# Patient Record
Sex: FEMALE | Race: WHITE | URBAN - METROPOLITAN AREA
[De-identification: names, ages, dates, MRNs, and addresses within clinical notes are randomized per-mention and may not be internally consistent; named-entity substitution may affect disease eponyms.]

---

## 2023-03-17 ENCOUNTER — TELEPHONE (OUTPATIENT)
Dept: FAMILY MEDICINE CLINIC | Facility: CLINIC | Age: 33
End: 2023-03-17

## 2023-03-17 ENCOUNTER — OFFICE VISIT (OUTPATIENT)
Dept: FAMILY MEDICINE CLINIC | Facility: CLINIC | Age: 33
End: 2023-03-17
Payer: COMMERCIAL

## 2023-03-17 VITALS
HEIGHT: 63.82 IN | HEART RATE: 85 BPM | BODY MASS INDEX: 32.35 KG/M2 | TEMPERATURE: 98 F | OXYGEN SATURATION: 97 % | DIASTOLIC BLOOD PRESSURE: 70 MMHG | SYSTOLIC BLOOD PRESSURE: 118 MMHG | RESPIRATION RATE: 18 BRPM | WEIGHT: 187.19 LBS

## 2023-03-17 DIAGNOSIS — F41.9 ANXIETY AND DEPRESSION: ICD-10-CM

## 2023-03-17 DIAGNOSIS — Z13.29 SCREENING FOR ENDOCRINE, NUTRITIONAL, METABOLIC AND IMMUNITY DISORDER: ICD-10-CM

## 2023-03-17 DIAGNOSIS — F32.A ANXIETY AND DEPRESSION: ICD-10-CM

## 2023-03-17 DIAGNOSIS — Z13.0 SCREENING FOR ENDOCRINE, NUTRITIONAL, METABOLIC AND IMMUNITY DISORDER: ICD-10-CM

## 2023-03-17 DIAGNOSIS — Z13.21 SCREENING FOR ENDOCRINE, NUTRITIONAL, METABOLIC AND IMMUNITY DISORDER: ICD-10-CM

## 2023-03-17 DIAGNOSIS — Z12.4 SCREENING FOR CERVICAL CANCER: ICD-10-CM

## 2023-03-17 DIAGNOSIS — E66.9 OBESITY (BMI 30-39.9): ICD-10-CM

## 2023-03-17 DIAGNOSIS — Z00.00 ANNUAL PHYSICAL EXAM: Primary | ICD-10-CM

## 2023-03-17 DIAGNOSIS — Z13.228 SCREENING FOR ENDOCRINE, NUTRITIONAL, METABOLIC AND IMMUNITY DISORDER: ICD-10-CM

## 2023-03-17 DIAGNOSIS — Z11.3 SCREEN FOR STD (SEXUALLY TRANSMITTED DISEASE): ICD-10-CM

## 2023-03-17 DIAGNOSIS — N72 CERVICITIS: ICD-10-CM

## 2023-03-17 PROCEDURE — 87624 HPV HI-RISK TYP POOLED RSLT: CPT | Performed by: FAMILY MEDICINE

## 2023-03-17 PROCEDURE — 99385 PREV VISIT NEW AGE 18-39: CPT | Performed by: FAMILY MEDICINE

## 2023-03-17 PROCEDURE — 87491 CHLMYD TRACH DNA AMP PROBE: CPT | Performed by: FAMILY MEDICINE

## 2023-03-17 PROCEDURE — 87591 N.GONORRHOEAE DNA AMP PROB: CPT | Performed by: FAMILY MEDICINE

## 2023-03-17 PROCEDURE — 3078F DIAST BP <80 MM HG: CPT | Performed by: FAMILY MEDICINE

## 2023-03-17 PROCEDURE — 3008F BODY MASS INDEX DOCD: CPT | Performed by: FAMILY MEDICINE

## 2023-03-17 PROCEDURE — 3074F SYST BP LT 130 MM HG: CPT | Performed by: FAMILY MEDICINE

## 2023-03-17 RX ORDER — ESCITALOPRAM OXALATE 10 MG/1
10 TABLET ORAL DAILY
Qty: 30 TABLET | Refills: 1 | Status: SHIPPED | OUTPATIENT
Start: 2023-03-17 | End: 2023-04-15

## 2023-03-17 NOTE — TELEPHONE ENCOUNTER
Pt called stating that [facility] is unable to see the lab orders from this morning, and pt wanted to add the rest of the STI testing. Provider has added the STI testing labs to the orders and sent to [facility].   Pt informed, and verified that [facility] can see the lab orders.

## 2023-03-20 ENCOUNTER — PATIENT MESSAGE (OUTPATIENT)
Dept: FAMILY MEDICINE CLINIC | Facility: CLINIC | Age: 33
End: 2023-03-20

## 2023-03-20 LAB
C TRACH DNA SPEC QL NAA+PROBE: NEGATIVE
HPV I/H RISK 1 DNA SPEC QL NAA+PROBE: NEGATIVE
N GONORRHOEA DNA SPEC QL NAA+PROBE: NEGATIVE

## 2023-03-21 ENCOUNTER — PATIENT MESSAGE (OUTPATIENT)
Dept: FAMILY MEDICINE CLINIC | Facility: CLINIC | Age: 33
End: 2023-03-21

## 2023-03-22 ENCOUNTER — PATIENT MESSAGE (OUTPATIENT)
Dept: FAMILY MEDICINE CLINIC | Facility: CLINIC | Age: 33
End: 2023-03-22

## 2023-03-22 LAB
HEPATITIS B SURFACE$ANTIBODY QL: REACTIVE
HSV 1 IGG TYPE SPECIFIC$AB: 23.3 INDEX
HSV 2 IGG TYPE SPECIFIC$AB: 1.39 INDEX
SIGNAL TO CUT-OFF: 0.1
TREPONEMA PALLIDUM AB, PARTICLE AGGLUTINATION: REACTIVE
TSH W/REFLEX TO FT4: 0.96 MIU/L

## 2023-03-22 NOTE — TELEPHONE ENCOUNTER
From: Zechariah Benitez  To: Luciano Arellano MD  Sent: 3/22/2023 1:37 PM CDT  Subject: Question regarding RPR W/CONF    Hi, what is this test and the result?

## 2023-04-10 ENCOUNTER — PATIENT MESSAGE (OUTPATIENT)
Dept: FAMILY MEDICINE CLINIC | Facility: CLINIC | Age: 33
End: 2023-04-10

## 2023-04-11 NOTE — TELEPHONE ENCOUNTER
From: Marcus Coulter  To: Senia Adan MD  Sent: 4/10/2023 7:10 PM CDT  Subject: Possible Referral     Hi, so I wanted ask for guidance in my next step. I attached a photo of my butt crack that has the same lesion cut type as the photo I had attached from my vagina. My question is, is this something you can look into?  Or would I need a referral to a gynecologist or dermatologist?

## 2023-04-15 DIAGNOSIS — F32.A ANXIETY AND DEPRESSION: ICD-10-CM

## 2023-04-15 DIAGNOSIS — F41.9 ANXIETY AND DEPRESSION: ICD-10-CM

## 2023-04-18 RX ORDER — ESCITALOPRAM OXALATE 10 MG/1
10 TABLET ORAL DAILY
Qty: 90 TABLET | Refills: 1 | Status: SHIPPED | OUTPATIENT
Start: 2023-04-18

## 2023-04-21 ENCOUNTER — OFFICE VISIT (OUTPATIENT)
Dept: FAMILY MEDICINE CLINIC | Facility: CLINIC | Age: 33
End: 2023-04-21
Payer: COMMERCIAL

## 2023-04-21 VITALS
HEART RATE: 77 BPM | RESPIRATION RATE: 18 BRPM | WEIGHT: 194.81 LBS | DIASTOLIC BLOOD PRESSURE: 87 MMHG | BODY MASS INDEX: 34 KG/M2 | SYSTOLIC BLOOD PRESSURE: 126 MMHG | OXYGEN SATURATION: 99 % | TEMPERATURE: 98 F

## 2023-04-21 DIAGNOSIS — B37.2 CANDIDAL INTERTRIGO: Primary | ICD-10-CM

## 2023-04-21 PROCEDURE — 99214 OFFICE O/P EST MOD 30 MIN: CPT | Performed by: FAMILY MEDICINE

## 2023-04-21 PROCEDURE — 3074F SYST BP LT 130 MM HG: CPT | Performed by: FAMILY MEDICINE

## 2023-04-21 PROCEDURE — 3079F DIAST BP 80-89 MM HG: CPT | Performed by: FAMILY MEDICINE

## 2023-10-31 DIAGNOSIS — F41.9 ANXIETY AND DEPRESSION: ICD-10-CM

## 2023-10-31 DIAGNOSIS — F32.A ANXIETY AND DEPRESSION: ICD-10-CM

## 2023-10-31 DIAGNOSIS — B37.2 CANDIDAL INTERTRIGO: ICD-10-CM

## 2023-10-31 RX ORDER — ESCITALOPRAM OXALATE 10 MG/1
15 TABLET ORAL DAILY
Qty: 135 TABLET | Refills: 0 | OUTPATIENT
Start: 2023-10-31

## 2023-10-31 RX ORDER — ESCITALOPRAM OXALATE 20 MG/1
20 TABLET ORAL DAILY
Qty: 90 TABLET | Refills: 0 | Status: SHIPPED | OUTPATIENT
Start: 2023-10-31

## 2023-10-31 NOTE — TELEPHONE ENCOUNTER
Patient comment: I started taking 20mg about a month ago   Called pt to inform that she is due for follow up visit with Dr. Rob Rocha and she needs to discuss medication dose changes with the doctor. Virtual visit scheduled for 11/3/2023. Refill no protocol available:     Pt requesting refill of   Requested Prescriptions     Pending Prescriptions Disp Refills    escitalopram (LEXAPRO) 10 MG Oral Tab 135 tablet 0     Sig: Take 1.5 tablets (15 mg total) by mouth daily.      Sent to Provider for review:    Last Time Medication was Filled: 8/16/2023    Last Office Visit with Provider: 8/16/2023 telemedicine    Appt scheduled on 11/3/2023

## 2023-10-31 NOTE — TELEPHONE ENCOUNTER
Refill no protocol available:     Pt requesting refill of   Requested Prescriptions     Pending Prescriptions Disp Refills    NYSTATIN-TRIAMCINOLONE 100,000-0.1 Units/g-% External Cream [Pharmacy Med Name: Nystatin-Triamcinolone External Cream 651985-5.1 UNIT/GM-%] 60 g 0     Sig: Apply 1 Application topically 2 (two) times daily. Sent to Provider for review:    Last Time Medication was Filled: 4/21/2023    Last Office Visit with Provider: 8/16/2023 televisit    Appt scheduled on 11/03/2023   No future appointments.

## 2023-11-01 NOTE — TELEPHONE ENCOUNTER
Please call or message Bahman Wu to let her know that this time I sent in the 20 mg tab of Escitalopram.  So take 1 tab now (she previously had the 10 mg tab). Thanks.

## 2023-11-06 ENCOUNTER — PATIENT MESSAGE (OUTPATIENT)
Dept: FAMILY MEDICINE CLINIC | Facility: CLINIC | Age: 33
End: 2023-11-06

## 2023-12-04 DIAGNOSIS — F41.9 ANXIETY AND DEPRESSION: ICD-10-CM

## 2023-12-04 DIAGNOSIS — F32.A ANXIETY AND DEPRESSION: ICD-10-CM

## 2023-12-05 RX ORDER — BUPROPION HYDROCHLORIDE 150 MG/1
150 TABLET ORAL DAILY
Qty: 30 TABLET | Refills: 1 | OUTPATIENT
Start: 2023-12-05

## 2023-12-05 NOTE — TELEPHONE ENCOUNTER
30 tablets with 1 refill sent to [facility] on 11/3/2023. Called pharmacy and asked to fill remaninig refill.

## 2024-01-07 DIAGNOSIS — F41.9 ANXIETY AND DEPRESSION: ICD-10-CM

## 2024-01-07 DIAGNOSIS — F32.A ANXIETY AND DEPRESSION: ICD-10-CM

## 2024-01-09 RX ORDER — ESCITALOPRAM OXALATE 20 MG/1
20 TABLET ORAL DAILY
Qty: 90 TABLET | Refills: 0 | Status: SHIPPED | OUTPATIENT
Start: 2024-01-09

## 2024-01-09 RX ORDER — BUPROPION HYDROCHLORIDE 150 MG/1
150 TABLET ORAL DAILY
Qty: 90 TABLET | Refills: 0 | Status: SHIPPED | OUTPATIENT
Start: 2024-01-09

## 2024-01-09 NOTE — TELEPHONE ENCOUNTER
Refill no protocol available:     Pt requesting refill of   Requested Prescriptions     Pending Prescriptions Disp Refills    escitalopram (LEXAPRO) 20 MG Oral Tab 90 tablet 0     Sig: Take 1 tablet (20 mg total) by mouth daily.    buPROPion  MG Oral Tablet 24 Hr 30 tablet 1     Sig: Take 1 tablet (150 mg total) by mouth daily.           Sent to Provider for review:    Last Time Medication was Filled:  11/03/2023 BuPROPion  10/31/2023 Lexapro    Last Office Visit with Provider: 11/03/2023 televisit      No future appointments.

## 2024-01-25 ENCOUNTER — TELEPHONE (OUTPATIENT)
Dept: FAMILY MEDICINE CLINIC | Facility: CLINIC | Age: 34
End: 2024-01-25

## 2024-01-25 ENCOUNTER — PATIENT MESSAGE (OUTPATIENT)
Dept: FAMILY MEDICINE CLINIC | Facility: CLINIC | Age: 34
End: 2024-01-25

## 2024-01-25 ENCOUNTER — MED REC SCAN ONLY (OUTPATIENT)
Dept: FAMILY MEDICINE CLINIC | Facility: CLINIC | Age: 34
End: 2024-01-25

## 2024-01-25 NOTE — TELEPHONE ENCOUNTER
LMOM to return call to the office. Provided pt office phone [phone] along with office hours.        Patient will need to scheduled Physical exam due 03/2024.

## 2024-01-25 NOTE — TELEPHONE ENCOUNTER
From: Nimisha Kinney  To: Sherry Bermudez  Sent: 1/25/2024 8:57 AM CST  Subject: Blood test 2024    Hi, I’ve attached this year blood results. Please let me know if there is anything I should be worried about

## 2024-03-15 ENCOUNTER — OFFICE VISIT (OUTPATIENT)
Dept: FAMILY MEDICINE CLINIC | Facility: CLINIC | Age: 34
End: 2024-03-15
Payer: COMMERCIAL

## 2024-03-15 VITALS
HEIGHT: 63.82 IN | BODY MASS INDEX: 32.84 KG/M2 | RESPIRATION RATE: 18 BRPM | WEIGHT: 190 LBS | TEMPERATURE: 98 F | SYSTOLIC BLOOD PRESSURE: 128 MMHG | DIASTOLIC BLOOD PRESSURE: 76 MMHG

## 2024-03-15 DIAGNOSIS — F32.A ANXIETY AND DEPRESSION: ICD-10-CM

## 2024-03-15 DIAGNOSIS — R39.15 URINARY URGENCY: ICD-10-CM

## 2024-03-15 DIAGNOSIS — Z13.0 SCREENING FOR ENDOCRINE, NUTRITIONAL, METABOLIC AND IMMUNITY DISORDER: ICD-10-CM

## 2024-03-15 DIAGNOSIS — F84.0 AUTISM SPECTRUM DISORDER (HCC): ICD-10-CM

## 2024-03-15 DIAGNOSIS — F41.9 ANXIETY AND DEPRESSION: ICD-10-CM

## 2024-03-15 DIAGNOSIS — Z23 NEED FOR VACCINATION: ICD-10-CM

## 2024-03-15 DIAGNOSIS — Z13.6 SCREENING FOR ISCHEMIC HEART DISEASE: ICD-10-CM

## 2024-03-15 DIAGNOSIS — Z13.21 SCREENING FOR ENDOCRINE, NUTRITIONAL, METABOLIC AND IMMUNITY DISORDER: ICD-10-CM

## 2024-03-15 DIAGNOSIS — Z13.29 SCREENING FOR ENDOCRINE, NUTRITIONAL, METABOLIC AND IMMUNITY DISORDER: ICD-10-CM

## 2024-03-15 DIAGNOSIS — Z00.00 ANNUAL PHYSICAL EXAM: Primary | ICD-10-CM

## 2024-03-15 DIAGNOSIS — Z13.228 SCREENING FOR ENDOCRINE, NUTRITIONAL, METABOLIC AND IMMUNITY DISORDER: ICD-10-CM

## 2024-03-15 PROCEDURE — 99395 PREV VISIT EST AGE 18-39: CPT | Performed by: FAMILY MEDICINE

## 2024-03-15 NOTE — PROGRESS NOTES
Chief Complaint   Patient presents with    Physical     Annual exam w/o pap        HPI:   Nimisha Kinney is a 33 year old female who presents for a complete physical without gyne exam.   Patient feels well, dental visit up to date, no hearing problem.  Vaccinations: declines Flu    LMP: 3/08/24  Sexual activity:h/o female partner  Contraception: not interested  Exercise: occasional.  Diet:  regular    Wt Readings from Last 3 Encounters:   03/15/24 190 lb (86.2 kg)   04/21/23 194 lb 12.8 oz (88.4 kg)   03/17/23 187 lb 3.2 oz (84.9 kg)      BP Readings from Last 3 Encounters:   03/15/24 128/76   04/21/23 126/87   03/17/23 118/70     Patient's last menstrual period was 03/08/2024 (exact date).     Annual physical:  Overall pt states she feels well.     LMP: 3/08/24  Sexually Active: h/o female partner   Last pap smear: 3/2023, normal pap and HPV neg (rpt in 5 yrs)  Last mammogram: n/a  Last colonoscopy: n/a  Last DEXA Scan: n/a    Exercise: occasional  Diet: regular     Anxiety/Depression f-u: Pt states she is doing well on her current medication regimen of Lexapro and Bupropion. \"I don't feel like a robot.\"  She states she is \"living my best self right now\" and not sure if \"is it me? Or is since the medication change that I'm feeling better.\"  Energy level is good. Appetite is good. She is not feeling down or depressed.     Previous HPI: Pt states she has felt down lately- called off from work last week and 2 days this week so far.  She has decreased motivation.  Appetite is decreased. She is oversleeping.  She has no SI.  She already self-increased Lexapro to 20 mg (from 15 mg), tolerating well.     Previous HPI: She has been taking Lexapro 10 mg.  Anxiety is decreased and well-controlled,but \"I feel like a ghost, like a background character.\"  She has been sleeping more and has a decreased appetite. She has no SI.  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 She  continues to meet with her therapist regularly, weekly.     Previous HPI:  She was dx'd 1.5 months ago, since she was feeling sad and had low energy.  She was having SI, but no plan.  She knews she felt a certain way since she was a kid, but didn't recognize what it was. \"I knew I was different all of my life.\"  She has been attending counseling at [facility] since 9/2022 ([facility]). She was in couples therapy before with her ex-wife.  She was prescribed Lexapro 4 yrs ago, but did not take it consistently over a course of 6 months, then she stopped since she had a lapse in insurance.  She was also taking Trazodone, but was having vivid dreams on this.  She drinks EtOH, she also smokes marijuana daily \"to unwind and decompress.\"  She states she never has shown up under the influence at work.   She self-dx'd herself as on the spectrum from her own research.  A book she is reading has made her aware of her behaviors.  She states she has a h/o trauma.  Today she has no SI and no plan.     Urinary problem: pt states that she has had this problem \"forever.\"  She will urinate, but then shortly afterwards, she has to urinate again.  She has no burning with urination, she has no blood in the urine, no pelvic pain, no CVAT.  She is wondering if this is a nerve issue with her bladder.          Current Outpatient Medications   Medication Sig Dispense Refill    escitalopram (LEXAPRO) 20 MG Oral Tab Take 1 tablet (20 mg total) by mouth daily. 90 tablet 0    buPROPion  MG Oral Tablet 24 Hr Take 1 tablet (150 mg total) by mouth daily. 90 tablet 0    nystatin-triamcinolone 100,000-0.1 Units/g-% External Cream Apply 1 Application topically 2 (two) times daily. 60 g 1      Past Medical History:   Diagnosis Date    Anxiety     Depression       History reviewed. No pertinent surgical history.   Family History   Problem Relation Age of Onset    Anxiety Mother     Depression Mother     Thyroid Disorder Mother      Seizure Disorder Sister       Social History:  Social History     Socioeconomic History    Marital status: OTHER   Tobacco Use    Smoking status: Never    Smokeless tobacco: Never   Substance and Sexual Activity    Alcohol use: Not Currently    Drug use: Yes     Types: Cannabis     Comment: Everyday   Other Topics Concern    Caffeine Concern No    Exercise Yes     Comment: 10,000 steps everyday    Seat Belt Yes    Special Diet No    Stress Concern Yes    Weight Concern No       Allergies:  No Known Allergies     REVIEW OF SYSTEMS:     Review of Systems   Constitutional:  Negative for appetite change.   Gastrointestinal:  Negative for abdominal pain, diarrhea, nausea and vomiting.   Genitourinary:  Positive for frequency. Negative for difficulty urinating, dysuria, flank pain and menstrual problem.   Psychiatric/Behavioral:  Negative for confusion, decreased concentration, dysphoric mood and sleep disturbance. The patient is not nervous/anxious and is not hyperactive.         EXAM:   /76 (BP Location: Left arm, Patient Position: Sitting, Cuff Size: adult)   Temp 97.8 °F (36.6 °C) (Temporal)   Resp 18   Ht 5' 3.82\" (1.621 m)   Wt 190 lb (86.2 kg)   LMP 03/08/2024 (Exact Date)   BMI 32.80 kg/m²    GENERAL: WD/WN in no acute distress.   HEENT: PERRLA and EOMI.  OP moist no lesions.TM WNL, toya.Normal ears canals bilaterally.  Neck is supple, with no cervical LAD or thyroid abnormalities.  LUNGS: are clear to auscultation bilaterally, with no wheeze, rhonchi, or rales.   HEART: is RRR.  S1, S2, with no murmurs,clicks, gallops  BREAST:deferred  ABDOMEN: is soft,NBS, NT/ND with no HSM.  No rebound or guarding. No CVA tenderness, no hernias.   EXAM: deferred  RECTAL EXAM: deferred  NEURO: Cranial nerves II-XII normal,no focal abnormalities, and reflexes coordination and gait normal and symmetric.Sensation intact.  EXTREMETIES: are symmetric with no cyanosis, clubbing, or edema.  MS: Normal muscles tones, no  joints abnormalities.  SKIN: Normal color, turgor, no lesions, rashes or wounds.  PSYCH: normal affect and mood.    ASSESSMENT AND PLAN:     33 year old female with     1. Annual physical exam  Routine labs ordered today, await results. Counseled pt on healthy lifestyle changes. Vaccines today: declines Flu . Contraception: not interested .     Last pap smear: 3/2023, normal pap and HPV neg (rpt in 5 yrs)    - CBC With Differential With Platelet  - Comp Metabolic Panel  - Lipid Panel  - TSH W Reflex To Free T4    2. Anxiety and depression  - officially dx'd almost 1 year ago  - regular counseling at [facility]  - cont Lexapro 20 mg daily and Bupropion  mg QAM  - medication f-u in 6 months    3. Autism spectrum disorder (HCC)  - pt self-dx;d with autism  - recommend she see Neuropsych for formal testing and Dx    4. Screening for ischemic heart disease    - Lipid Panel    5. Screening for endocrine, nutritional, metabolic and immunity disorder    - CBC With Differential With Platelet  - Comp Metabolic Panel  - Lipid Panel  - TSH W Reflex To Free T4    6. Need for vaccination    - INFLUENZA REFUSED [facility]    7. Urinary urgency  - referral to Urogynecology    - Urogynecology Referral - In Network        Pt's was recommended low fat diet and aerobic exercise 30 minutes three times weekly.   Health maintenance.   Osteoporosis prevention addressed  Recommended whole food type diet, eliminate processed food/low sugar and low sat fat diet      The patient indicates understanding of these issues and agrees to the plan.    Return in about 6 months (around 9/15/2024) for anxiety, depression, medication follow-up.

## 2024-03-19 PROBLEM — F84.0 AUTISM SPECTRUM DISORDER (HCC): Status: ACTIVE | Noted: 2024-03-19

## 2024-04-11 DIAGNOSIS — F41.9 ANXIETY AND DEPRESSION: ICD-10-CM

## 2024-04-11 DIAGNOSIS — F32.A ANXIETY AND DEPRESSION: ICD-10-CM

## 2024-04-12 RX ORDER — ESCITALOPRAM OXALATE 20 MG/1
20 TABLET ORAL DAILY
Qty: 90 TABLET | Refills: 0 | Status: SHIPPED | OUTPATIENT
Start: 2024-04-12

## 2024-04-12 RX ORDER — BUPROPION HYDROCHLORIDE 150 MG/1
150 TABLET ORAL DAILY
Qty: 90 TABLET | Refills: 0 | Status: SHIPPED | OUTPATIENT
Start: 2024-04-12

## 2024-04-12 RX ORDER — BUPROPION HYDROCHLORIDE 150 MG/1
150 TABLET ORAL DAILY
Qty: 90 TABLET | Refills: 0 | OUTPATIENT
Start: 2024-04-12

## 2024-04-12 RX ORDER — ESCITALOPRAM OXALATE 20 MG/1
20 TABLET ORAL DAILY
Qty: 90 TABLET | Refills: 0 | OUTPATIENT
Start: 2024-04-12

## 2024-04-12 NOTE — TELEPHONE ENCOUNTER
Refill Passed Protocol:     Pt requesting refill of   Requested Prescriptions     Pending Prescriptions Disp Refills    escitalopram (LEXAPRO) 20 MG Oral Tab 90 tablet 0     Sig: Take 1 tablet (20 mg total) by mouth daily.    buPROPion  MG Oral Tablet 24 Hr 90 tablet 0     Sig: Take 1 tablet (150 mg total) by mouth daily.      Refill was approved and sent to pharmacy:   Anxiety and depression  - officially dx'd almost 1 year ago  - regular counseling at [facility]  - cont Lexapro 20 mg daily and Bupropion  mg QAM  - medication f-u in 6 months    Last Time Medication was Filled:  1/9/2024    Last Office Visit with Provider: 3/15/2024    No future appointments.     Return in about 6 months (around 9/15/2024) for anxiety, depression, medication follow-up.

## 2024-07-12 ENCOUNTER — TELEMEDICINE (OUTPATIENT)
Dept: FAMILY MEDICINE CLINIC | Facility: CLINIC | Age: 34
End: 2024-07-12
Payer: MEDICAID

## 2024-07-12 DIAGNOSIS — F41.9 ANXIETY AND DEPRESSION: ICD-10-CM

## 2024-07-12 DIAGNOSIS — F32.A ANXIETY AND DEPRESSION: ICD-10-CM

## 2024-07-12 PROCEDURE — 99214 OFFICE O/P EST MOD 30 MIN: CPT | Performed by: FAMILY MEDICINE

## 2024-07-12 RX ORDER — BUPROPION HYDROCHLORIDE 300 MG/1
300 TABLET ORAL DAILY
Qty: 90 TABLET | Refills: 1 | Status: SHIPPED | OUTPATIENT
Start: 2024-07-12

## 2024-07-12 NOTE — PROGRESS NOTES
Video Visit    This visit is conducted using Telemedicine with live, interactive video and audio.    Nimisha Kinney  verbally consents to a Video visit.    Patient understands and accepts financial responsibility for any deductible, co-insurance and/or co-pays associated with this service.    Duration of the service: 7:02 minutes      Summary of topics discussed:     Anxiety/Depression f-u: She has had increased anxiety lately with personal issues. 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  She has been taking the Lexapro and Wellbutrin consistently, no missed doses.  Despite taking care of her anxiety the way she has and addressing issues, she feels like she is just showing up to work \"and going through the  motions.\"  She feels like she is now missing the \"boost\" from when she first started the medication.  She is not in therapy right now, is familiar with how to cope and is very self aware.  Appetite is ok, sleeping ok.    Previous HPI from 3/2024: Pt states she is doing well on her current medication regimen of Lexapro and Bupropion. \"I don't feel like a robot.\"  She states she is \"living my best self right now\" and not sure if \"is it me? Or is since the medication change that I'm feeling better.\"  Energy level is good. Appetite is good. She is not feeling down or depressed.      Previous HPI: Pt states she has felt down lately- called off from work last week and 2 days this week so far.  She has decreased motivation.  Appetite is decreased. She is oversleeping.  She has no SI.  She already self-increased Lexapro to 20 mg (from 15 mg), tolerating well.     Previous HPI: She has been taking Lexapro 10 mg.  Anxiety is decreased and well-controlled,but \"I feel like a ghost, like a background character.\"  She has been sleeping more and has a decreased appetite. She has no SI.  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 She  continues to meet with her therapist regularly, weekly.     Previous HPI:  She was dx'd 1.5 months ago, since she was feeling sad and had low energy.  She was having SI, but no plan.  She knews she felt a certain way since she was a kid, but didn't recognize what it was. \"I knew I was different all of my life.\"  She has been attending counseling at [facility] since 9/2022 ([facility]). She was in couples therapy before with her ex-wife.  She was prescribed Lexapro 4 yrs ago, but did not take it consistently over a course of 6 months, then she stopped since she had a lapse in insurance.  She was also taking Trazodone, but was having vivid dreams on this.  She drinks EtOH, she also smokes marijuana daily \"to unwind and decompress.\"  She states she never has shown up under the influence at work.   She self-dx'd herself as on the spectrum from her own research.  A book she is reading has made her aware of her behaviors.  She states she has a h/o trauma.  Today she has no SI and no plan.       ROS: as stated per HPI  Physical Exam: Exam is limited due to no face to face visit today. Pt is awake and alert, does not appear to be in acute distress. Has normal affect. Answering questions appropriately.      Assessment/Plan:  1) Anxiety and depression  - no longer doing regular counseling at [facility]  - cont Lexapro 20 mg daily  - INCREASE Bupropion ER to 300 mg QAM  - medication f-u in 4-6 wks or sooner prn    Orders Placed This Encounter    buPROPion  MG Oral Tablet 24 Hr     Sig: Take 1 tablet (300 mg total) by mouth daily.     Dispense:  90 tablet     Refill:  1      No orders of the defined types were placed in this encounter.         Return for anxiety, depression, medication follow-up in 4-6 wks.      Sherry Bermudez MD

## 2024-08-19 DIAGNOSIS — F32.A ANXIETY AND DEPRESSION: ICD-10-CM

## 2024-08-19 DIAGNOSIS — F41.9 ANXIETY AND DEPRESSION: ICD-10-CM

## 2024-08-20 NOTE — TELEPHONE ENCOUNTER
Refill no protocol available:     Pt requesting refill of   Requested Prescriptions     Pending Prescriptions Disp Refills    ESCITALOPRAM 20 MG Oral Tab [Pharmacy Med Name: Escitalopram Oxalate Oral Tablet 20 MG] 90 tablet 0     Sig: Take 1 tablet (20 mg total) by mouth daily.           Sent to Provider for review:    Last Time Medication was Filled:  04/12/2024 90 tabs     Last Office Visit with Provider: 07/12/2024   No future appointments. Patient due for 4-6 week follow up now.   Left voicemail to return office call to schedule appointment. Provided office contact number

## 2024-08-21 RX ORDER — ESCITALOPRAM OXALATE 20 MG/1
20 TABLET ORAL DAILY
Qty: 90 TABLET | Refills: 0 | OUTPATIENT
Start: 2024-08-21

## 2024-08-23 ENCOUNTER — PATIENT MESSAGE (OUTPATIENT)
Dept: FAMILY MEDICINE CLINIC | Facility: CLINIC | Age: 34
End: 2024-08-23

## 2024-08-23 DIAGNOSIS — F32.A ANXIETY AND DEPRESSION: ICD-10-CM

## 2024-08-23 DIAGNOSIS — F41.9 ANXIETY AND DEPRESSION: ICD-10-CM

## 2024-08-23 RX ORDER — ESCITALOPRAM OXALATE 20 MG/1
20 TABLET ORAL DAILY
Qty: 90 TABLET | Refills: 0 | Status: SHIPPED | OUTPATIENT
Start: 2024-08-23

## 2024-08-23 NOTE — TELEPHONE ENCOUNTER
Refill Passed Protocol:     Pt requesting refill of   Requested Prescriptions     Pending Prescriptions Disp Refills    escitalopram (LEXAPRO) 20 MG Oral Tab 90 tablet 0     Sig: Take 1 tablet (20 mg total) by mouth daily.     Refill was approved and sent to pharmacy:   Anxiety and depression  - no longer doing regular counseling at [facility]  - cont Lexapro 20 mg daily  - INCREASE Bupropion ER to 300 mg QAM  - medication f-u in 4-6 wks or sooner prn     Last Time Medication was Filled:  4/12/2024 90 days 0 refill    Last Office Visit with Provider: Telemedicine 7/12/2024    No future appointments.  Return for anxiety, depression, medication follow-up in 4-6 wks.

## 2024-08-26 NOTE — TELEPHONE ENCOUNTER
From: Nimisha Kinney  To: Sherry Bermudez  Sent: 8/23/2024 1:39 PM CDT  Subject: Lexapro refill    Hello    I wanted to know if there is an issue with my lexpro being filled. The lela said it was denied and it wasn’t h been filled at [facility] like I requested. Im currently out of that medication

## 2024-11-22 DIAGNOSIS — F41.9 ANXIETY AND DEPRESSION: ICD-10-CM

## 2024-11-22 DIAGNOSIS — F32.A ANXIETY AND DEPRESSION: ICD-10-CM

## 2024-11-22 NOTE — TELEPHONE ENCOUNTER
Pt requesting refill of   Requested Prescriptions     Pending Prescriptions Disp Refills    buPROPion  MG Oral Tablet 24 Hr 90 tablet 0     Sig: Take 1 tablet (300 mg total) by mouth daily.     Last Time Medication was Filled:  7/12/2024 90 days 1 refill    Last Office Visit with Provider: Telemedicine 7/12/2024  Anxiety and depression  - no longer doing regular counseling at [facility]  - cont Lexapro 20 mg daily  - INCREASE Bupropion ER to 300 mg QAM  - medication f-u in 4-6 wks or sooner prn    No future appointments.

## 2024-11-23 RX ORDER — BUPROPION HYDROCHLORIDE 300 MG/1
300 TABLET ORAL DAILY
Qty: 30 TABLET | Refills: 0 | Status: SHIPPED | OUTPATIENT
Start: 2024-11-23

## 2024-11-29 DIAGNOSIS — F41.9 ANXIETY AND DEPRESSION: ICD-10-CM

## 2024-11-29 DIAGNOSIS — F32.A ANXIETY AND DEPRESSION: ICD-10-CM

## 2024-12-02 ENCOUNTER — PATIENT MESSAGE (OUTPATIENT)
Dept: FAMILY MEDICINE CLINIC | Facility: CLINIC | Age: 34
End: 2024-12-02

## 2024-12-02 DIAGNOSIS — F32.A ANXIETY AND DEPRESSION: ICD-10-CM

## 2024-12-02 DIAGNOSIS — F41.9 ANXIETY AND DEPRESSION: ICD-10-CM

## 2024-12-02 RX ORDER — ESCITALOPRAM OXALATE 20 MG/1
20 TABLET ORAL DAILY
Qty: 90 TABLET | Refills: 0 | OUTPATIENT
Start: 2024-12-02

## 2024-12-02 NOTE — TELEPHONE ENCOUNTER
Pt requesting refill of   Requested Prescriptions     Pending Prescriptions Disp Refills    ESCITALOPRAM 20 MG Oral Tab [Pharmacy Med Name: Escitalopram Oxalate Oral Tablet 20 MG] 90 tablet 0     Sig: TAKE 1 TABLET BY MOUTH EVERY DAY     Last Time Medication was Filled:  8/23/2024 90 days 0 refills    Last Office Visit with Provider: Telemedicine 7/12/2024  Anxiety and depression  - no longer doing regular counseling at [facility]  - cont Lexapro 20 mg daily  - INCREASE Bupropion ER to 300 mg QAM  - medication f-u in 4-6 wks or sooner prn    Return for anxiety, depression, medication follow-up in 4-6 wks.   No future appointments.

## 2024-12-02 NOTE — TELEPHONE ENCOUNTER
Patient is overdue for follow up appointment    Pt requesting refill of   Requested Prescriptions     Pending Prescriptions Disp Refills    ESCITALOPRAM 20 MG Oral Tab [Pharmacy Med Name: Escitalopram Oxalate Oral Tablet 20 MG] 90 tablet 0     Sig: TAKE 1 TABLET BY MOUTH EVERY DAY     Last Time Medication was Filled:  8/23/2024 9 days 0 refills    Last Office Visit with Provider: Telemedicine 7/12/2024  Anxiety and depression  - no longer doing regular counseling at [facility]  - cont Lexapro 20 mg daily  - INCREASE Bupropion ER to 300 mg QAM  - medication f-u in 4-6 wks or sooner prn    Return for anxiety, depression, medication follow-up in 4-6 wks.   No future appointments.

## 2025-04-25 ENCOUNTER — OFFICE VISIT (OUTPATIENT)
Dept: FAMILY MEDICINE CLINIC | Facility: CLINIC | Age: 35
End: 2025-04-25
Payer: MEDICAID

## 2025-04-25 VITALS
BODY MASS INDEX: 27.83 KG/M2 | HEART RATE: 75 BPM | HEIGHT: 63.82 IN | SYSTOLIC BLOOD PRESSURE: 132 MMHG | WEIGHT: 161 LBS | TEMPERATURE: 98 F | DIASTOLIC BLOOD PRESSURE: 68 MMHG | RESPIRATION RATE: 18 BRPM | OXYGEN SATURATION: 99 %

## 2025-04-25 DIAGNOSIS — R25.1 TREMOR: ICD-10-CM

## 2025-04-25 DIAGNOSIS — F41.9 ANXIETY AND DEPRESSION: ICD-10-CM

## 2025-04-25 DIAGNOSIS — F32.A ANXIETY AND DEPRESSION: ICD-10-CM

## 2025-04-25 DIAGNOSIS — Z00.00 ANNUAL PHYSICAL EXAM: Primary | ICD-10-CM

## 2025-04-25 PROCEDURE — 99395 PREV VISIT EST AGE 18-39: CPT | Performed by: FAMILY MEDICINE

## 2025-04-25 NOTE — PROGRESS NOTES
Chief Complaint   Patient presents with    Physical     Annual exam w/o pap        HPI:   Nimisha Kinney is a 34 year old female who presents for a complete physical without gyne exam.   Patient feels well, dental visit overdue (has new insurance), no hearing problem.  Vaccinations up to date.  4/13/25  LMP:   Sexual activity:not discussed today  Contraception: not interested  Exercise:  active at work .  Diet:  regular    Wt Readings from Last 3 Encounters:   04/25/25 161 lb (73 kg)   02/26/25 163 lb 12.8 oz (74.3 kg)   03/15/24 190 lb (86.2 kg)      BP Readings from Last 3 Encounters:   04/25/25 132/68   02/26/25 124/82   03/15/24 128/76     Patient's last menstrual period was 04/13/2025 (exact date).     Annual physical:  Overall pt states she feels well. 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    LMP: 4/13/25  Sexually Active: not discussed  Last pap smear: 3/2023, normal pap and HPV neg (rpt 5 yrs)  Last mammogram: n/a  Last Colon Ca screening: n/a  Last DEXA Scan: n/a    Exercise:  active at work  Diet: regular      Anxiety/Depression f-u: Mood is stable, energy level is good. She has some difficulty with sleep.  She has a good appetite.  She is trying to be healthier, has lost ~30 lbs in the past year.  She is worried about the hand tremors (see below).    Previous HPI: pt states that she had been doing well on the Bupropion 450 mg, but rna out 2 wks ago.  States she feels ok, no withdrawal sx currently.  She continues to take Lexapro. She reports a decreased appetite.  Has lost weight unintentionally, but also recognizes that she has been moving more at her job.  She is very tired at the end of the work day. Sleeping ok. She has no SI/HI, no plan.     Previous HPI in 12/2024: She ran out of Lexapro 2 wks ago, has had increased anxiety since then. \"I've been losing my shit.\"  She continues to take Bupropion  mg.  She continues to have fatigue.  Currently not  going to any therapy.  She reenrolled and is back in school again.     Previous HPI from 7/2024: She has had increased anxiety lately with personal issues. 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  She has been taking the Lexapro and Wellbutrin consistently, no missed doses.  Despite taking care of her anxiety the way she has and addressing issues, she feels like she is just showing up to work \"and going through the  motions.\"  She feels like she is now missing the \"boost\" from when she first started the medication.  She is not in therapy right now, is familiar with how to cope and is very self aware.  Appetite is ok, sleeping ok.     Previous HPI from 3/2024: Pt states she is doing well on her current medication regimen of Lexapro and Bupropion. \"I don't feel like a robot.\"  She states she is \"living my best self right now\" and not sure if \"is it me? Or is since the medication change that I'm feeling better.\"  Energy level is good. Appetite is good. She is not feeling down or depressed.      Previous HPI: Pt states she has felt down lately- called off from work last week and 2 days this week so far.  She has decreased motivation.  Appetite is decreased. She is oversleeping.  She has no SI.  She already self-increased Lexapro to 20 mg (from 15 mg), tolerating well.     Previous HPI: She has been taking Lexapro 10 mg.  Anxiety is decreased and well-controlled,but \"I feel like a ghost, like a background character.\"  She has been sleeping more and has a decreased appetite. She has no SI.  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 She continues to meet with her therapist regularly, weekly.     Previous HPI:  She was dx'd 1.5 months ago, since she was feeling sad and had low energy.  She was having SI, but no plan.  She knews she felt a certain way since she was a kid, but didn't recognize what it was. \"I knew I was different all  of my life.\"  She has been attending counseling at [facility] since 9/2022 (Keven Mitchell). She was in couples therapy before with her ex-wife.  She was prescribed Lexapro 4 yrs ago, but did not take it consistently over a course of 6 months, then she stopped since she had a lapse in insurance.  She was also taking Trazodone, but was having vivid dreams on this.  She drinks EtOH, she also smokes marijuana daily \"to unwind and decompress.\"  She states she never has shown up under the influence at work.   She self-dx'd herself as on the spectrum from her own research.  A book she is reading has made her aware of her behaviors.  She states she has a h/o trauma.  Today she has no SI and no plan.     Tremors: pt has noticed that when she was off of the Wellbutrin for sometime and was increased Wellbutrin to 450 mg, that is when the hand tremors started.  Occurs in bilateral hands, feels so shaky that it is getting in the way of her daily activities and students at work are noticing. She looked up SE of Well butrin and concludes this is what is causing her tremors.    Previous HPI:  pt has a 1-2 wks h/o hand tremors, at least this is when she noticed this.  She does not consume any caffeine, she is eating regular meals and drinking water.  Sometimes she has dropped objects, she has no weakness and has no intention tremor.                              Current Medications[1]   Past Medical History[2]   Past Surgical History[3]   Family History[4]   Social History:  Short Social Hx on File[5]    Allergies:  Allergies[6]     REVIEW OF SYSTEMS:     Review of Systems   Constitutional:  Negative for appetite change, chills and fever.   Gastrointestinal:  Negative for abdominal pain, constipation, diarrhea, nausea and vomiting.   Genitourinary:  Negative for dysuria and menstrual problem.   Neurological:  Positive for tremors.   Psychiatric/Behavioral:  Negative for sleep disturbance.         EXAM:   /68 (BP  Location: Left arm, Patient Position: Sitting, Cuff Size: adult)   Pulse 75   Temp 97.5 °F (36.4 °C) (Temporal)   Resp 18   Ht 5' 3.82\" (1.621 m)   Wt 161 lb (73 kg)   LMP 04/13/2025 (Exact Date)   SpO2 99%   BMI 27.79 kg/m²    GENERAL: WD/WN in no acute distress.   HEENT: PERRLA and EOMI.  OP moist no lesions.TM WNL, toya.Normal ears canals bilaterally.  Neck is supple, with no cervical LAD or thyroid abnormalities.  LUNGS: are clear to auscultation bilaterally, with no wheeze, rhonchi, or rales.   HEART: is RRR.  S1, S2, with no murmurs,clicks, gallops  BREAST:deferred  ABDOMEN: is soft,NBS, NT/ND with no HSM.  No rebound or guarding. No CVA tenderness, no hernias.   EXAM: deferred  RECTAL EXAM: deferred  NEURO: Cranial nerves II-XII normal,no focal abnormalities, and reflexes coordination and gait normal and symmetric.Sensation intact. +bilateral hand tremors  EXTREMETIES: are symmetric with no cyanosis, clubbing, or edema.  MS: Normal muscles tones, no joints abnormalities.  SKIN: Normal color, turgor, no lesions, rashes or wounds.  PSYCH: normal affect and mood.    ASSESSMENT AND PLAN:     34 year old female with     1. Annual physical exam  Routine labs ordered today, await results. Counseled pt on healthy lifestyle changes. Vaccines today: UTD . Contraception: not interested .    Last pap smear: 3/2023, normal pap and HPV neg (rpt 5 yrs)     2. Anxiety and depression  - no longer doing regular counseling at [facility]  - cont Lexapro 20 mg daily  - DECREASE Buproprion XL to 300 mg every day (down from 450 mg), as this may help the tremors  - medication f-u in 4 wks or sooner prn       3. Tremor  - likely SE of Wellbutrin  - see above        Pt's was recommended low fat diet and aerobic exercise 30 minutes three times weekly.   Health maintenance.   Osteoporosis prevention addressed  Recommended whole food type diet, eliminate processed food/low sugar and low sat fat diet      The patient  indicates understanding of these issues and agrees to the plan.    Return in about 4 weeks (around 5/23/2025) for anxiety, medication follow-up.       [1]   Current Outpatient Medications   Medication Sig Dispense Refill    escitalopram (LEXAPRO) 20 MG Oral Tab Take 1 tablet (20 mg total) by mouth daily. 90 tablet 1    buPROPion  MG Oral Tablet 24 Hr Take 1 tablet (300 mg total) by mouth daily. 90 tablet 1    nystatin-triamcinolone 100,000-0.1 Units/g-% External Cream Apply 1 Application topically 2 (two) times daily. 60 g 1   [2]   Past Medical History:   Anxiety    Depression   [3] History reviewed. No pertinent surgical history.  [4]   Family History  Problem Relation Age of Onset    Anxiety Mother     Depression Mother     Thyroid Disorder Mother     Seizure Disorder Sister    [5]   Social History  Socioeconomic History    Marital status: OTHER   Tobacco Use    Smoking status: Never    Smokeless tobacco: Never   Substance and Sexual Activity    Alcohol use: Not Currently    Drug use: Yes     Types: Cannabis     Comment: Everyday   Other Topics Concern    Caffeine Concern No    Exercise Yes     Comment: 10,000 steps everyday    Seat Belt Yes    Special Diet No    Stress Concern Yes    Weight Concern No   [6] No Known Allergies

## 2025-06-04 DIAGNOSIS — F41.9 ANXIETY AND DEPRESSION: ICD-10-CM

## 2025-06-04 DIAGNOSIS — F32.A ANXIETY AND DEPRESSION: ICD-10-CM

## 2025-06-05 RX ORDER — ESCITALOPRAM OXALATE 20 MG/1
20 TABLET ORAL DAILY
Qty: 90 TABLET | Refills: 3 | Status: SHIPPED | OUTPATIENT
Start: 2025-06-05

## 2025-06-05 NOTE — TELEPHONE ENCOUNTER
Refill passed per Clinic protocol.  Requested Prescriptions   Pending Prescriptions Disp Refills    ESCITALOPRAM 20 MG Oral Tab [Pharmacy Med Name: Escitalopram Oxalate Oral Tablet 20 MG] 90 tablet 0     Sig: TAKE 1 TABLET BY MOUTH EVERY DAY       Psychiatric Non-Scheduled (Anti-Anxiety) Passed - 6/5/2025  9:28 AM        Passed - In person appointment or virtual visit in the past 6 mos or appointment in next 3 mos     Recent Outpatient Visits              [facility]    Office Visit    3 months ago [facility]xiety and depression    [facility]    Office Visit    6 months ago [facility] and depression    [facility]    Telemedicine    10 months ago Anxiety and depression    [facility]    [facility]medicine    1 year ago [facility] physical exam    [facility]    Office Visit                      Passed - Depression Screening completed within the past 12 months        Passed - Medication is active on med list

## 2025-07-07 DIAGNOSIS — F32.A ANXIETY AND DEPRESSION: ICD-10-CM

## 2025-07-07 DIAGNOSIS — F41.9 ANXIETY AND DEPRESSION: ICD-10-CM

## 2025-07-11 RX ORDER — BUPROPION HYDROCHLORIDE 150 MG/1
TABLET ORAL
Qty: 90 TABLET | Refills: 0 | OUTPATIENT
Start: 2025-07-11

## 2025-07-11 RX ORDER — BUPROPION HYDROCHLORIDE 300 MG/1
300 TABLET ORAL DAILY
Qty: 90 TABLET | Refills: 1 | OUTPATIENT
Start: 2025-07-11

## 2025-07-11 NOTE — TELEPHONE ENCOUNTER
Dear Nursing staff,    Please call patient to clarify what dose of Bupropion patent is taking as per office visit on 4/25/2025    2. Anxiety and depression  - no longer doing regular counseling at [facility]  - cont Lexapro 20 mg daily  - DECREASE Buproprion XL to 300 mg every day (down from 450 mg), as this may help the tremors  - medication f-u in 4 wks or sooner prn    Patients MyChart message:       buPROPion  MG Oral Tablet 24 Hr [Sherry Bermudez]      Patient Comment: Can I get 150mg? Thats what jeet been taking with tbe lexpro daily.       Thank You ,  Cecelia Duncan

## 2025-07-14 RX ORDER — BUPROPION HYDROCHLORIDE 150 MG/1
150 TABLET ORAL DAILY
Qty: 90 TABLET | Refills: 0 | Status: SHIPPED | OUTPATIENT
Start: 2025-07-14

## 2025-07-14 NOTE — TELEPHONE ENCOUNTER
Please review. Protocol Failed; No Protocol    Pended Buproprion 150 mg:    Please see patients MyChart and advise:      I’m currently taking 150mg. I don’t have insurance to be able to come in for another appointment. So I guess I’ll just keep taking the lexapro 20 mg by its self until I can get insurance. Last time I talked to Doc she told me I can just let her know in the messages how I was doing when I dropped dosage. I dropped from 450 to 150mg. 300 was still too much.

## (undated) NOTE — LETTER
Date: 11/6/2023    Patient Name: Jean-Pierre Slater          To Whom it may concern: This letter has been written at the patient's request. The above patient was seen at the [facility] for treatment of a medical condition. This patient should be excused from attending work from 11/01/2023 through 11/03/2023.     The patient may return to work on 11/04/2023 with the following limitations: none        Sincerely,      Dorma Closs, MD